# Patient Record
Sex: MALE | Race: WHITE | NOT HISPANIC OR LATINO | URBAN - METROPOLITAN AREA
[De-identification: names, ages, dates, MRNs, and addresses within clinical notes are randomized per-mention and may not be internally consistent; named-entity substitution may affect disease eponyms.]

---

## 2021-10-22 ENCOUNTER — OFFICE (OUTPATIENT)
Dept: URBAN - METROPOLITAN AREA CLINIC 79 | Facility: CLINIC | Age: 76
End: 2021-10-22

## 2021-10-22 VITALS
TEMPERATURE: 96.7 F | WEIGHT: 192 LBS | SYSTOLIC BLOOD PRESSURE: 112 MMHG | HEIGHT: 74 IN | DIASTOLIC BLOOD PRESSURE: 68 MMHG | HEART RATE: 63 BPM

## 2021-10-22 DIAGNOSIS — I25.10 ATHEROSCLEROTIC HEART DISEASE OF NATIVE CORONARY ARTERY WITH: ICD-10-CM

## 2021-10-22 DIAGNOSIS — Z86.010 PERSONAL HISTORY OF COLONIC POLYPS: ICD-10-CM

## 2021-10-22 PROCEDURE — 99204 OFFICE O/P NEW MOD 45 MIN: CPT | Performed by: PHYSICIAN ASSISTANT

## 2021-10-22 PROCEDURE — 00031: CPT | Performed by: INTERNAL MEDICINE

## 2021-10-22 NOTE — SERVICEHPINOTES
Mr. Mills is here to discuss a colonoscopy. His last one was in 2002 and polyps were removed unsure what the pathology was. He hasn't had a colonoscopy since that time. Has a high PSA and this is being monitored. He has a BM on average 4 times per week. He has some hard stool and the subsequent stools have been easier to pass. Unsure if any blood in the stool or per rectum now. No abdominal pain, fever, chills. No unexplained changes in his weight.   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 He follows with cardiology every six months. He had a stress test in August (gets one every 5 yrs) and stress test was fine. No SOB or chest pain. Walks 3 miles every am. No family hx of GI issues.